# Patient Record
Sex: FEMALE | Race: BLACK OR AFRICAN AMERICAN | NOT HISPANIC OR LATINO | Employment: UNEMPLOYED | URBAN - METROPOLITAN AREA
[De-identification: names, ages, dates, MRNs, and addresses within clinical notes are randomized per-mention and may not be internally consistent; named-entity substitution may affect disease eponyms.]

---

## 2024-06-25 ENCOUNTER — APPOINTMENT (OUTPATIENT)
Dept: PRIMARY CARE | Facility: CLINIC | Age: 59
End: 2024-06-25
Payer: MEDICARE

## 2024-07-02 ENCOUNTER — OFFICE VISIT (OUTPATIENT)
Dept: PRIMARY CARE | Facility: CLINIC | Age: 59
End: 2024-07-02
Payer: MEDICARE

## 2024-07-02 ENCOUNTER — APPOINTMENT (OUTPATIENT)
Dept: PRIMARY CARE | Facility: CLINIC | Age: 59
End: 2024-07-02
Payer: MEDICARE

## 2024-07-02 VITALS
SYSTOLIC BLOOD PRESSURE: 117 MMHG | HEIGHT: 64 IN | WEIGHT: 127 LBS | HEART RATE: 77 BPM | DIASTOLIC BLOOD PRESSURE: 68 MMHG | BODY MASS INDEX: 21.68 KG/M2

## 2024-07-02 DIAGNOSIS — E87.6 HYPOKALEMIA: ICD-10-CM

## 2024-07-02 DIAGNOSIS — R10.2 PELVIC PAIN: ICD-10-CM

## 2024-07-02 DIAGNOSIS — D50.0 IRON DEFICIENCY ANEMIA DUE TO CHRONIC BLOOD LOSS: ICD-10-CM

## 2024-07-02 DIAGNOSIS — Z00.00 ANNUAL PHYSICAL EXAM: Primary | ICD-10-CM

## 2024-07-02 PROCEDURE — 82607 VITAMIN B-12: CPT

## 2024-07-02 PROCEDURE — 80053 COMPREHEN METABOLIC PANEL: CPT

## 2024-07-02 PROCEDURE — 80353 DRUG SCREENING COCAINE: CPT

## 2024-07-02 PROCEDURE — 99386 PREV VISIT NEW AGE 40-64: CPT | Performed by: NURSE PRACTITIONER

## 2024-07-02 PROCEDURE — 84443 ASSAY THYROID STIM HORMONE: CPT

## 2024-07-02 PROCEDURE — 80061 LIPID PANEL: CPT

## 2024-07-02 PROCEDURE — 80349 CANNABINOIDS NATURAL: CPT

## 2024-07-02 PROCEDURE — 85025 COMPLETE CBC W/AUTO DIFF WBC: CPT

## 2024-07-02 PROCEDURE — 80307 DRUG TEST PRSMV CHEM ANLYZR: CPT

## 2024-07-02 PROCEDURE — 36415 COLL VENOUS BLD VENIPUNCTURE: CPT

## 2024-07-02 PROCEDURE — 83735 ASSAY OF MAGNESIUM: CPT

## 2024-07-02 PROCEDURE — 99204 OFFICE O/P NEW MOD 45 MIN: CPT | Performed by: NURSE PRACTITIONER

## 2024-07-02 PROCEDURE — 82306 VITAMIN D 25 HYDROXY: CPT

## 2024-07-02 ASSESSMENT — PATIENT HEALTH QUESTIONNAIRE - PHQ9
2. FEELING DOWN, DEPRESSED OR HOPELESS: NOT AT ALL
SUM OF ALL RESPONSES TO PHQ9 QUESTIONS 1 AND 2: 0
1. LITTLE INTEREST OR PLEASURE IN DOING THINGS: NOT AT ALL

## 2024-07-02 ASSESSMENT — ENCOUNTER SYMPTOMS
OCCASIONAL FEELINGS OF UNSTEADINESS: 0
LOSS OF SENSATION IN FEET: 0
DEPRESSION: 0

## 2024-07-02 NOTE — PROGRESS NOTES
"Reason for Visit: Annual Physical Exam    HPI: Tru is a 58 year old female who presents to the office today as a new patient for a physical exam. She was recently at [facility] for pelvic pain; had CT of abdomen/pelvis that showed Marked endometrial thickening and heterogeneity which is highly suggestive for endometrial carcinoma.     Right-sided periuterine, external iliac, and inguinal lymph nodes   suggestive for metastatic disease.     Bilateral adrenal masses, nonspecific but metastatic lesions also not   excluded.     She did not get set up to see specialist at [facility] as she was told by [facility] that her health insurance wasn't accepted there.     She has been having significant pelvic pain/cramping along with vaginal bleeding. She has been taking OTC pain medications which is not helping her pelvic pain. She is tearful during office visit today.     Past surgical history of C. Section x 3. No past medical history. Not currently on any medications. No known drug allergies.     She is single. Has 4 grown children; ages 42, 40, 32 & 29. Stated she gave up 29 year old, as a , for adoption. She is post menopausal. Stated she has had vaginal bleeding \"on and off\" since .    Not up-to-date on mammogram or colonoscopy. Denied breast pain or breast masses.     She smokes 1/2 ppd of cigarettes for the past 20 years. Drinks alcohol nightly (1-3 wine coolers). Uses marijuana to help \"with her pain\".     Mother with history of metastatic cancer (bone, breast and uterine).       Active Problem List  There is no problem list on file for this patient.      Comprehensive Medical/Surgical/Social/Family History  History reviewed. No pertinent past medical history.  History reviewed. No pertinent surgical history.  Social History     Social History Narrative    Not on file         Allergies and Medications  Patient has no known allergies.  No current outpatient medications on file " "prior to visit.     No current facility-administered medications on file prior to visit.         ROS otherwise negative aside from what was mentioned above in HPI.    Vitals  /68   Pulse 77   Ht 1.626 m (5' 4\")   Wt 57.6 kg (127 lb)   BMI 21.80 kg/m²   Body mass index is 21.8 kg/m².  Physical Exam  Gen: Alert, NAD  HEENT:  PERRLA, EOMI, conjunctiva and sclera normal in appearance. External auditory canals/TMs normal; Oral cavity and posterior pharynx without lesions/exudate  Neck:  Supple with FROM; No masses/nodes palpable; Thyroid nontender and without nodules; No LEANN  Respiratory:  Lungs CTAB  Cardiovascular:  Heart RRR. No M/R/G. Peripheral pulses equal bilaterally  Abdomen:  Soft, nontender, BS present throughout; No R/G/R; No HSM. Palpable pelvic mass.  Extremities:  FROM all extremities; Muscle strength grossly normal with good tone  Neuro:  CN II-XII intact; Reflexes 2+/2+; Gross motor and sensory intact  Skin:  No suspicious lesions present  Breast: No masses, skin lesions or nipple discharges, no axillary lymphadenopathy    Assessment and Plan:  Problem List Items Addressed This Visit    None  Visit Diagnoses       Pelvic pain    -  Primary    Relevant Orders    Drug Screen, Urine With Reflex to Confirmation (Completed)    THC (Marijuana), Urine, Confirmation (Completed)    Cocaine, Urine, Confirmation    Annual physical exam        Relevant Orders    Comprehensive Metabolic Panel (Completed)    Lipid Panel (Completed)    TSH with reflex to Free T4 if abnormal (Completed)    Vitamin D 25-Hydroxy,Total (for eval of Vitamin D levels) (Completed)    Vitamin B12 (Completed)    Iron deficiency anemia due to chronic blood loss        Relevant Orders    CBC and Auto Differential (Completed)    Morphology (Completed)    Type and screen    Hypokalemia        Relevant Orders    Magnesium (Completed)        1) Endometrial carcinoma: secure chat with Renee Delaney (Dr. Sorto's ). Dr. Sorto's " partner (appt July 8th, 2024 with Dr. Esquivel at 9:20 am [facility] located in [facility] [address]     2) Health maintenance: screening mammogram ordered. Will need a colonoscopy as well. Routine blood work ordered.    3) Iron deficiency anemia: Counseled on foods that are high in iron (red meat, seafood, chicken, beans, green leafy vegetables, iron-fortified cereals, breads and pasta). Scheduled for PRBCs transfusion on Monday, July 8.

## 2024-07-03 DIAGNOSIS — D50.0 IRON DEFICIENCY ANEMIA DUE TO CHRONIC BLOOD LOSS: ICD-10-CM

## 2024-07-03 LAB
25(OH)D3 SERPL-MCNC: 19 NG/ML (ref 30–100)
ALBUMIN SERPL BCP-MCNC: 3.4 G/DL (ref 3.4–5)
ALP SERPL-CCNC: 105 U/L (ref 33–136)
ALT SERPL W P-5'-P-CCNC: 7 U/L (ref 7–45)
AMPHETAMINES UR QL SCN: ABNORMAL
ANION GAP SERPL CALC-SCNC: 14 MMOL/L (ref 10–20)
AST SERPL W P-5'-P-CCNC: 13 U/L (ref 9–39)
BARBITURATES UR QL SCN: ABNORMAL
BASOPHILS # BLD AUTO: 0.04 X10*3/UL (ref 0–0.1)
BASOPHILS NFR BLD AUTO: 0.3 %
BENZODIAZ UR QL SCN: ABNORMAL
BILIRUB SERPL-MCNC: 0.2 MG/DL (ref 0–1.2)
BUN SERPL-MCNC: 6 MG/DL (ref 6–23)
BZE UR QL SCN: ABNORMAL
CALCIUM SERPL-MCNC: 9.8 MG/DL (ref 8.6–10.6)
CANNABINOIDS UR QL SCN: ABNORMAL
CHLORIDE SERPL-SCNC: 96 MMOL/L (ref 98–107)
CHOLEST SERPL-MCNC: 168 MG/DL (ref 0–199)
CHOLESTEROL/HDL RATIO: 4.5
CO2 SERPL-SCNC: 31 MMOL/L (ref 21–32)
CREAT SERPL-MCNC: 0.65 MG/DL (ref 0.5–1.05)
EGFRCR SERPLBLD CKD-EPI 2021: >90 ML/MIN/1.73M*2
EOSINOPHIL # BLD AUTO: 1.06 X10*3/UL (ref 0–0.7)
EOSINOPHIL NFR BLD AUTO: 8.5 %
ERYTHROCYTE [DISTWIDTH] IN BLOOD BY AUTOMATED COUNT: 23.6 % (ref 11.5–14.5)
FENTANYL+NORFENTANYL UR QL SCN: ABNORMAL
GLUCOSE SERPL-MCNC: 105 MG/DL (ref 74–99)
HCT VFR BLD AUTO: 26.6 % (ref 36–46)
HDLC SERPL-MCNC: 37.4 MG/DL
HGB BLD-MCNC: 7 G/DL (ref 12–16)
HYPOCHROMIA BLD QL SMEAR: NORMAL
IMM GRANULOCYTES # BLD AUTO: 0.05 X10*3/UL (ref 0–0.7)
IMM GRANULOCYTES NFR BLD AUTO: 0.4 % (ref 0–0.9)
LDLC SERPL CALC-MCNC: 93 MG/DL
LYMPHOCYTES # BLD AUTO: 1.97 X10*3/UL (ref 1.2–4.8)
LYMPHOCYTES NFR BLD AUTO: 15.8 %
MAGNESIUM SERPL-MCNC: 1.95 MG/DL (ref 1.6–2.4)
MCH RBC QN AUTO: 19.3 PG (ref 26–34)
MCHC RBC AUTO-ENTMCNC: 26.3 G/DL (ref 32–36)
MCV RBC AUTO: 74 FL (ref 80–100)
METHADONE UR QL SCN: ABNORMAL
MONOCYTES # BLD AUTO: 0.78 X10*3/UL (ref 0.1–1)
MONOCYTES NFR BLD AUTO: 6.3 %
NEUTROPHILS # BLD AUTO: 8.58 X10*3/UL (ref 1.2–7.7)
NEUTROPHILS NFR BLD AUTO: 68.7 %
NON HDL CHOLESTEROL: 131 MG/DL (ref 0–149)
NRBC BLD-RTO: 0 /100 WBCS (ref 0–0)
OPIATES UR QL SCN: ABNORMAL
OVALOCYTES BLD QL SMEAR: NORMAL
OXYCODONE+OXYMORPHONE UR QL SCN: ABNORMAL
PCP UR QL SCN: ABNORMAL
PLATELET # BLD AUTO: 360 X10*3/UL (ref 150–450)
POTASSIUM SERPL-SCNC: 2.7 MMOL/L (ref 3.5–5.3)
PROT SERPL-MCNC: 7.1 G/DL (ref 6.4–8.2)
RBC # BLD AUTO: 3.62 X10*6/UL (ref 4–5.2)
RBC MORPH BLD: NORMAL
SODIUM SERPL-SCNC: 138 MMOL/L (ref 136–145)
TRIGL SERPL-MCNC: 186 MG/DL (ref 0–149)
TSH SERPL-ACNC: 2.44 MIU/L (ref 0.44–3.98)
VIT B12 SERPL-MCNC: 645 PG/ML (ref 211–911)
VLDL: 37 MG/DL (ref 0–40)
WBC # BLD AUTO: 12.5 X10*3/UL (ref 4.4–11.3)

## 2024-07-03 RX ORDER — ERGOCALCIFEROL 1.25 MG/1
50000 CAPSULE ORAL
Qty: 17 CAPSULE | Refills: 0 | OUTPATIENT
Start: 2024-07-07

## 2024-07-03 RX ORDER — FERROUS SULFATE 325(65) MG
1 TABLET, DELAYED RELEASE (ENTERIC COATED) ORAL 2 TIMES DAILY
Qty: 60 TABLET | Refills: 2 | OUTPATIENT
Start: 2024-07-03

## 2024-07-03 NOTE — PROGRESS NOTES
Needs to take ferrous sulfate 325 mg BID. Take with orange juice or vitamin C tablets (500 mg) to increase absorption. She has to be on this until seen by GYN on Monday.   Vitamin D low. Start vitamin D 50,000 units weekly for 4 months, then switch to OTC vitamin D 2000 units daily.

## 2024-07-05 DIAGNOSIS — E55.9 VITAMIN D DEFICIENCY: ICD-10-CM

## 2024-07-05 DIAGNOSIS — D50.0 IRON DEFICIENCY ANEMIA DUE TO CHRONIC BLOOD LOSS: Primary | ICD-10-CM

## 2024-07-05 DIAGNOSIS — D63.0 ANEMIA IN NEOPLASTIC DISEASE: Primary | ICD-10-CM

## 2024-07-05 DIAGNOSIS — E87.6 HYPOKALEMIA: ICD-10-CM

## 2024-07-05 LAB — CARBOXYTHC UR-MCNC: 76 NG/ML

## 2024-07-06 RX ORDER — ERGOCALCIFEROL 1.25 MG/1
50000 CAPSULE ORAL
Qty: 12 CAPSULE | Refills: 0 | Status: SHIPPED | OUTPATIENT
Start: 2024-07-07 | End: 2024-09-29

## 2024-07-06 RX ORDER — FERROUS SULFATE 325(65) MG
325 TABLET, DELAYED RELEASE (ENTERIC COATED) ORAL 2 TIMES DAILY
Qty: 60 TABLET | Refills: 2 | Status: SHIPPED | OUTPATIENT
Start: 2024-07-05

## 2024-07-06 RX ORDER — POTASSIUM CHLORIDE 20 MEQ/1
20 TABLET, EXTENDED RELEASE ORAL 2 TIMES DAILY
Qty: 30 TABLET | Refills: 0 | Status: SHIPPED | OUTPATIENT
Start: 2024-07-06 | End: 2024-07-07 | Stop reason: SDUPTHER

## 2024-07-07 DIAGNOSIS — E87.6 HYPOKALEMIA: ICD-10-CM

## 2024-07-08 LAB — BZE UR-MCNC: >2000 NG/ML

## 2024-07-08 RX ORDER — POTASSIUM CHLORIDE 20 MEQ/1
20 TABLET, EXTENDED RELEASE ORAL 2 TIMES DAILY
Qty: 60 TABLET | Refills: 0 | Status: SHIPPED | OUTPATIENT
Start: 2024-07-08

## 2024-07-10 ENCOUNTER — TELEPHONE (OUTPATIENT)
Dept: PRIMARY CARE | Facility: CLINIC | Age: 59
End: 2024-07-10
Payer: MEDICARE

## 2024-07-10 NOTE — TELEPHONE ENCOUNTER
Left VM that patient no-showed her appt with Dr. Esquivel on 7/08 and blood transfusion. Instructed, via voice message, to please return my call at [phone] so we can make arrangements to get her appt rescheduled for gyn oncology.

## 2024-07-11 ENCOUNTER — APPOINTMENT (OUTPATIENT)
Dept: RADIOLOGY | Facility: CLINIC | Age: 59
End: 2024-07-11
Payer: MEDICARE

## 2024-07-20 DIAGNOSIS — D50.0 IRON DEFICIENCY ANEMIA DUE TO CHRONIC BLOOD LOSS: ICD-10-CM

## 2024-07-22 RX ORDER — FERROUS SULFATE 325(65) MG
1 TABLET ORAL 2 TIMES DAILY
Qty: 180 TABLET | Refills: 1 | OUTPATIENT
Start: 2024-07-22

## 2024-07-24 ENCOUNTER — LAB (OUTPATIENT)
Dept: LAB | Facility: LAB | Age: 59
End: 2024-07-24
Payer: MEDICARE

## 2024-07-24 DIAGNOSIS — D63.0 ANEMIA IN NEOPLASTIC DISEASE: ICD-10-CM

## 2024-07-24 DIAGNOSIS — D50.0 IRON DEFICIENCY ANEMIA DUE TO CHRONIC BLOOD LOSS: ICD-10-CM

## 2024-07-24 LAB
HCT VFR BLD AUTO: 22.6 % (ref 36–46)
HGB BLD-MCNC: 6.1 G/DL (ref 12–16)

## 2024-07-24 PROCEDURE — 86901 BLOOD TYPING SEROLOGIC RH(D): CPT

## 2024-07-24 PROCEDURE — 85018 HEMOGLOBIN: CPT

## 2024-07-24 PROCEDURE — 85014 HEMATOCRIT: CPT

## 2024-07-24 PROCEDURE — 36415 COLL VENOUS BLD VENIPUNCTURE: CPT

## 2024-07-24 PROCEDURE — 86850 RBC ANTIBODY SCREEN: CPT

## 2024-07-24 PROCEDURE — 86900 BLOOD TYPING SEROLOGIC ABO: CPT

## 2024-07-25 DIAGNOSIS — D50.0 IRON DEFICIENCY ANEMIA DUE TO CHRONIC BLOOD LOSS: ICD-10-CM

## 2024-07-25 LAB
ABO GROUP (TYPE) IN BLOOD: NORMAL
ANTIBODY SCREEN: NORMAL
RH FACTOR (ANTIGEN D): NORMAL

## 2024-07-26 ENCOUNTER — TELEPHONE (OUTPATIENT)
Dept: PRIMARY CARE | Facility: CLINIC | Age: 59
End: 2024-07-26
Payer: MEDICARE

## 2024-07-26 NOTE — TELEPHONE ENCOUNTER
Received a telephone call from Tru today. She was crying; stated she is bleeding heavy in vaginal region. I discussed with her that her blood work should severe anemia with hgb of 6.1. I instructed her to go to [facility] ED for blood transfusion and I would reach out to gynecology oncology team. I did have a secure chat conversation with Dr. Catherine Sorto (gyn onc). She is on-call this weekend and will notify her team that Tru is going to [facility] ED. Tru was notified of this as well. [facility] was given report that Tru was on her way to the hospital; spoke with Luna, charge nurse, [facility] ED.

## 2024-07-31 DIAGNOSIS — E87.6 HYPOKALEMIA: ICD-10-CM

## 2024-08-01 RX ORDER — POTASSIUM CHLORIDE 1500 MG/1
20 TABLET, EXTENDED RELEASE ORAL 2 TIMES DAILY
Qty: 60 TABLET | Refills: 0 | Status: SHIPPED | OUTPATIENT
Start: 2024-08-01